# Patient Record
Sex: FEMALE | NOT HISPANIC OR LATINO | Employment: FULL TIME | URBAN - METROPOLITAN AREA
[De-identification: names, ages, dates, MRNs, and addresses within clinical notes are randomized per-mention and may not be internally consistent; named-entity substitution may affect disease eponyms.]

---

## 2021-05-30 ENCOUNTER — RECORDS - HEALTHEAST (OUTPATIENT)
Dept: ADMINISTRATIVE | Facility: CLINIC | Age: 41
End: 2021-05-30

## 2021-05-31 ENCOUNTER — RECORDS - HEALTHEAST (OUTPATIENT)
Dept: ADMINISTRATIVE | Facility: CLINIC | Age: 41
End: 2021-05-31

## 2023-11-12 ENCOUNTER — APPOINTMENT (OUTPATIENT)
Dept: RADIOLOGY | Facility: CLINIC | Age: 43
End: 2023-11-12
Attending: EMERGENCY MEDICINE
Payer: COMMERCIAL

## 2023-11-12 ENCOUNTER — HOSPITAL ENCOUNTER (EMERGENCY)
Facility: CLINIC | Age: 43
Discharge: HOME | End: 2023-11-12
Attending: EMERGENCY MEDICINE | Admitting: EMERGENCY MEDICINE
Payer: COMMERCIAL

## 2023-11-12 VITALS
TEMPERATURE: 98.2 F | HEART RATE: 81 BPM | RESPIRATION RATE: 22 BRPM | BODY MASS INDEX: 23 KG/M2 | OXYGEN SATURATION: 93 % | SYSTOLIC BLOOD PRESSURE: 117 MMHG | HEIGHT: 62 IN | WEIGHT: 125 LBS | DIASTOLIC BLOOD PRESSURE: 76 MMHG

## 2023-11-12 DIAGNOSIS — J06.9 URI WITH COUGH AND CONGESTION: ICD-10-CM

## 2023-11-12 DIAGNOSIS — J45.909 UNCOMPLICATED ASTHMA, UNSPECIFIED ASTHMA SEVERITY, UNSPECIFIED WHETHER PERSISTENT: ICD-10-CM

## 2023-11-12 LAB
FLUAV RNA SPEC QL NAA+PROBE: NEGATIVE
FLUBV RNA RESP QL NAA+PROBE: NEGATIVE
RSV RNA SPEC NAA+PROBE: NEGATIVE
SARS-COV-2 RNA RESP QL NAA+PROBE: NEGATIVE

## 2023-11-12 PROCEDURE — 71046 X-RAY EXAM CHEST 2 VIEWS: CPT

## 2023-11-12 PROCEDURE — 99284 EMERGENCY DEPT VISIT MOD MDM: CPT | Mod: 25

## 2023-11-12 PROCEDURE — 87637 SARSCOV2&INF A&B&RSV AMP PRB: CPT | Performed by: EMERGENCY MEDICINE

## 2023-11-12 RX ORDER — PREDNISONE 20 MG/1
40 TABLET ORAL DAILY
Qty: 10 TABLET | Refills: 0 | Status: SHIPPED | OUTPATIENT
Start: 2023-11-12 | End: 2023-11-17

## 2023-11-12 ASSESSMENT — ENCOUNTER SYMPTOMS
SINUS PAIN: 1
DIARRHEA: 0
SORE THROAT: 0
NAUSEA: 0
ABDOMINAL PAIN: 0
VOMITING: 0
COUGH: 1
RHINORRHEA: 0
SHORTNESS OF BREATH: 1

## 2023-11-12 ASSESSMENT — ACTIVITIES OF DAILY LIVING (ADL): ADLS_ACUITY_SCORE: 35

## 2023-11-12 NOTE — ED PROVIDER NOTES
Emergency Department Encounter      NAME: Carolyn Du  AGE: 43 year old female  YOB: 1980  MRN: [id number]  EVALUATION DATE & TIME: No admission date for patient encounter.    PCP: No primary care provider on file.    ED PROVIDER: Faisal Bravo M.D.      Chief Complaint   Patient presents with    Nasal Congestion    Asthma Exacerbation         FINAL IMPRESSION:  1. URI with cough and congestion    2. Uncomplicated asthma, unspecified asthma severity, unspecified whether persistent        MEDICAL DECISION MAKIN:38 AM I met with the patient, obtained history, performed an initial exam, and discussed options and plan for diagnostics and treatment here in the ED.   7:37 AM Rechecked and updated the patient on lab and imaging results. Discussed plan for discharge.    This patient is a 43-year-old female with a history of asthma who presents with difficulty breathing.  I reviewed the patient's medical records from the [facility] urgent care visit on 2022 when she had a cough and shortness of breath.  At that time her COVID test is positive.  Her symptoms were relieved with prednisone and Tessalon Perles.  The symptoms began on  with shortness of breath, dry cough and nasal congestion.  She has a history of asthma and used her albuterol nebulizer twice yesterday which helped a little bit but she remains symptomatic which prompted her to come to the ER.  On my exam in the ER her lungs were clear to auscultation so she did not need any additional albuterol nebulizer treatment in the ER.  We did do a chest x-ray though and it did not show any sign of pneumonia.  I independently reviewed and interpreted the chest x-ray.  We also tested for COVID, influenza and RSV and all these were negative.  The patient was discharged home with a prescription for a course of prednisone to use.  I considered admitting the patient however her symptoms were mild and she was  satting well on room air.    Pertinent Labs & Imaging studies reviewed. (See chart for details)    The importance of close follow up was discussed. We reviewed warning signs and symptoms, and I instructed Ms. Angelica Du to return to the emergency department immediately if she develops any new or worsening symptoms. I provided additional verbal discharge instructions. Ms. Angelica Du expressed understanding and agreement with this plan of care, her questions were answered, and she was discharged in stable condition.     Medical Decision Making     History:  Supplemental history from: Documented in chart, if applicable  External Record(s) reviewed: Reviewed [facility] visit from 12/13/2022.     Work Up:  Chart documentation includes differential considered and any EKGs or imaging independently interpreted by provider, where specified.  In additional to work up documented, I considered the following work up: Documented in chart, if applicable.     External consultation:  Discussion of management with another provider: Documented in chart, if applicable     Complicating factors:  Care impacted by chronic illness: asthma  Care affected by social determinants of health: Access to Medical Care     Disposition considerations: Discharge a prescription for prednisone.      MEDICATIONS GIVEN IN THE EMERGENCY:  Medications - No data to display    NEW PRESCRIPTIONS STARTED AT TODAY'S ER VISIT:  Discharge Medication List as of 11/12/2023  7:45 AM        START taking these medications    Details   predniSONE (DELTASONE) 20 MG tablet Take 2 tablets (40 mg) by mouth daily for 5 days, Disp-10 tablet, R-0, Local Print                =================================================================    HPI    Patient information was obtained from: Patient    Use of : N/A       Carolyn Du is a 43 year old female with a past medical history of asthma, who presents for evaluation of  congestion and difficulty breathing.    Per chart review, the patient presented to [facility] on 12/13/2022 for evaluation of a cough and shortness of breath. His Covid-19 test was positive at this time. The patient was discharged with prednisone and benzonatate. Recommended PCP follow-up.    The patient reports a sudden onset of a dry cough with associated shortness of breath, sinus pain, and congestion that began Wednesday (11/8). No known sick contacts. She notes a history of asthma and states that yesterday she used two albuterol nebulizer treatments, which provided temporary relief. She has not used any today, nor has she taken any Tylenol or ibuprofen. No history of diabetes, cancer, hypertension, or heart disease. The patient does not smoke.      Denies sore throat, body aches, rhinorrhea, abdominal pain, nausea, vomiting, diarrhea, chest pain, or any other complaints at this time.    REVIEW OF SYSTEMS   Review of Systems   Constitutional:         Negative for body aches.   HENT:  Positive for congestion and sinus pain. Negative for rhinorrhea and sore throat.    Respiratory:  Positive for cough and shortness of breath.    Cardiovascular:  Negative for chest pain.   Gastrointestinal:  Negative for abdominal pain, diarrhea, nausea and vomiting.   All other systems reviewed and are negative.     PAST MEDICAL HISTORY:  History reviewed. No pertinent past medical history.    PAST SURGICAL HISTORY:  History reviewed. No pertinent surgical history.    CURRENT MEDICATIONS:    No current facility-administered medications for this encounter.    Current Outpatient Medications:     predniSONE (DELTASONE) 20 MG tablet, Take 2 tablets (40 mg) by mouth daily for 5 days, Disp: 10 tablet, Rfl: 0    ALLERGIES:  Allergies   Allergen Reactions    Penicillins Unknown    Sulfa (Sulfonamide Antibiotics) [Sulfa Antibiotics] Nausea and Vomiting       FAMILY HISTORY:  History reviewed. No pertinent family  "history.    SOCIAL HISTORY:   Social History     Socioeconomic History    Marital status:    Tobacco Use    Smoking status: Never   Substance and Sexual Activity    Alcohol use: No       PHYSICAL EXAM:    Vitals: /76   Pulse 81   Temp 98.2  F (36.8  C) (Oral)   Resp 22   Ht 1.575 m (5' 2\")   Wt 56.7 kg (125 lb)   LMP 10/22/2023 (Approximate)   SpO2 93%   BMI 22.86 kg/m     Constitutional: Well developed, well nourished. Alert,  female in mild distress.   HEAD: Normocephalic, atraumatic. No sinus tenderness to percussion.  Eyes: PERRLA, EOM intact, conjunctiva clear, no discharge  ENT: mucous membranes moist, nose normal. No tonsillar enlargement or exudate.    Neck- Supple, gross ROM intact.  No JVD.  No palpable nodes. No cervical adenopathy.   Pulmonary: Clear to auscultation bilaterally, no respiratory distress, no wheezing, speaks full sentences easily. No retraction.  Chest: No chest wall tenderness  Cardiovascular: Normal heart rate, regular rhythm, no murmurs. No lower extremity edema, 2+ DP pulses.   GI: Soft, no tenderness to deep palpation in all quadrants, not distended, no masses.  No hepatosplenomegaly.  Musculoskeletal: Moving all 4 extremities intentionally and without pain. No obvious deformity.  Back: No CVA tenderness  Skin: Warm, dry, no rash.  Neurologic: Alert & oriented x 3, speech clear, moving all extremities spontaneously   Psychiatric: Affect normal, cooperative.     LAB:  All pertinent labs reviewed and interpreted.  Labs Ordered and Resulted from Time of ED Arrival to Time of ED Departure   INFLUENZA A/B, RSV, & SARS-COV2 PCR - Normal       Result Value    Influenza A PCR Negative      Influenza B PCR Negative      RSV PCR Negative      SARS CoV2 PCR Negative         RADIOLOGY:  XR Chest 2 Views   Final Result   IMPRESSION: Negative chest. No substantial compared to 03/04/2017.          Jessy MTZ, am serving as a scribe to document services personally " performed by Dr. Faisal Bravo based on my observation and the provider's statements to me. I, Faisal Bravo M.D. attest that Jessy Courtney is acting in a scribe capacity, has observed my performance of the services and has documented them in accordance with my direction.      Faisal Bravo M.D.  Emergency Medicine  [facility] EMERGENCY ROOM  [address] [id number]  [phone]  Dept: [phone]     Faisal Bravo MD  11/27/23 2128

## 2023-11-12 NOTE — ED TRIAGE NOTES
To ED per POV, accompanied by her .    C/o nasal congestion and asthma exacerbation since Wednesday,  minimally controlled by OTC/prescribed medications. Nonproductive cough noted.     Negative at home Covid test yesterday.     Triage Assessment (Adult)       Row Name 11/12/23 0631          Triage Assessment    Airway WDL WDL        Respiratory WDL    Respiratory WDL X;cough     Cough Frequency frequent        Skin Circulation/Temperature WDL    Skin Circulation/Temperature WDL WDL        Cardiac WDL    Cardiac WDL rhythm;X     Pulse Rate & Regularity tachycardic        Peripheral/Neurovascular WDL    Peripheral Neurovascular WDL WDL        Cognitive/Neuro/Behavioral WDL    Cognitive/Neuro/Behavioral WDL WDL

## 2025-01-31 ENCOUNTER — LAB REQUISITION (OUTPATIENT)
Dept: LAB | Facility: CLINIC | Age: 45
End: 2025-01-31
Payer: COMMERCIAL

## 2025-01-31 DIAGNOSIS — Z13.1 ENCOUNTER FOR SCREENING FOR DIABETES MELLITUS: ICD-10-CM

## 2025-01-31 DIAGNOSIS — Z12.4 ENCOUNTER FOR SCREENING FOR MALIGNANT NEOPLASM OF CERVIX: ICD-10-CM

## 2025-01-31 DIAGNOSIS — Z13.29 ENCOUNTER FOR SCREENING FOR OTHER SUSPECTED ENDOCRINE DISORDER: ICD-10-CM

## 2025-01-31 DIAGNOSIS — B35.1 TINEA UNGUIUM: ICD-10-CM

## 2025-01-31 LAB
EST. AVERAGE GLUCOSE BLD GHB EST-MCNC: 105 MG/DL
HBA1C MFR BLD: 5.3 %

## 2025-01-31 PROCEDURE — 87624 HPV HI-RISK TYP POOLED RSLT: CPT | Mod: ORL | Performed by: OBSTETRICS & GYNECOLOGY

## 2025-01-31 PROCEDURE — 83036 HEMOGLOBIN GLYCOSYLATED A1C: CPT | Mod: ORL | Performed by: OBSTETRICS & GYNECOLOGY

## 2025-01-31 PROCEDURE — 84443 ASSAY THYROID STIM HORMONE: CPT | Mod: ORL | Performed by: OBSTETRICS & GYNECOLOGY

## 2025-01-31 PROCEDURE — 80053 COMPREHEN METABOLIC PANEL: CPT | Mod: ORL | Performed by: OBSTETRICS & GYNECOLOGY

## 2025-01-31 PROCEDURE — G0145 SCR C/V CYTO,THINLAYER,RESCR: HCPCS | Mod: ORL | Performed by: OBSTETRICS & GYNECOLOGY

## 2025-02-01 LAB
ALBUMIN SERPL BCG-MCNC: 4.3 G/DL (ref 3.5–5.2)
ALP SERPL-CCNC: 63 U/L (ref 40–150)
ALT SERPL W P-5'-P-CCNC: 9 U/L (ref 0–50)
ANION GAP SERPL CALCULATED.3IONS-SCNC: 9 MMOL/L (ref 7–15)
AST SERPL W P-5'-P-CCNC: 16 U/L (ref 0–45)
BILIRUB SERPL-MCNC: 0.3 MG/DL
BUN SERPL-MCNC: 10.4 MG/DL (ref 6–20)
CALCIUM SERPL-MCNC: 9.4 MG/DL (ref 8.8–10.4)
CHLORIDE SERPL-SCNC: 107 MMOL/L (ref 98–107)
CREAT SERPL-MCNC: 0.64 MG/DL (ref 0.51–0.95)
EGFRCR SERPLBLD CKD-EPI 2021: >90 ML/MIN/1.73M2
GLUCOSE SERPL-MCNC: 76 MG/DL (ref 70–99)
HCO3 SERPL-SCNC: 25 MMOL/L (ref 22–29)
POTASSIUM SERPL-SCNC: 3.9 MMOL/L (ref 3.4–5.3)
PROT SERPL-MCNC: 7.2 G/DL (ref 6.4–8.3)
SODIUM SERPL-SCNC: 141 MMOL/L (ref 135–145)
TSH SERPL DL<=0.005 MIU/L-ACNC: 1.8 UIU/ML (ref 0.3–4.2)

## 2025-02-03 LAB
HPV HR 12 DNA CVX QL NAA+PROBE: NEGATIVE
HPV16 DNA CVX QL NAA+PROBE: NEGATIVE
HPV18 DNA CVX QL NAA+PROBE: NEGATIVE
HUMAN PAPILLOMA VIRUS FINAL DIAGNOSIS: NORMAL

## 2025-02-05 LAB
BKR AP ASSOCIATED HPV REPORT: NORMAL
BKR LAB AP GYN ADEQUACY: NORMAL
BKR LAB AP GYN INTERPRETATION: NORMAL
BKR LAB AP LMP: NORMAL
BKR LAB AP PREVIOUS ABNL DX: NORMAL
BKR LAB AP PREVIOUS ABNORMAL: NORMAL
PATH REPORT.COMMENTS IMP SPEC: NORMAL
PATH REPORT.COMMENTS IMP SPEC: NORMAL
PATH REPORT.RELEVANT HX SPEC: NORMAL